# Patient Record
Sex: FEMALE | Race: WHITE | HISPANIC OR LATINO | Employment: OTHER | URBAN - METROPOLITAN AREA
[De-identification: names, ages, dates, MRNs, and addresses within clinical notes are randomized per-mention and may not be internally consistent; named-entity substitution may affect disease eponyms.]

---

## 2017-10-17 NOTE — PATIENT DISCUSSION
2 week PO: Patient is doing well post-operatively. The importance of post-op drop compliance was emphasized. Drop scheduled reviewed with patient. Patient to call if any visual changes or concerns.

## 2018-05-22 NOTE — PROCEDURE NOTE: SURGICAL
Prior to commencing surgery patient identification, surgical procedure, site, and side were confirmed by Dr. Ree Leung. Following topical proparacaine anesthesia, the patient was positioned at the YAG laser, a contact lens coupled to the cornea with methylcellulose and an axial posterior capsulotomy performed without complication using 3.3 Mj x 51. Excess methylcellulose was washed from the eye, one drop of Alphagan was instilled and the patient returned to the holding area having tolerated the procedure well and without complication. <br />[id number]

## 2018-05-29 NOTE — PROCEDURE NOTE: SURGICAL
<p>Prior to commencing surgery patient identification, surgical procedure, site, and side were confirmed by Dr. Ty Pelaez. Following topical proparacaine anesthesia, the patient was positioned at the YAG laser, a contact lens coupled to the cornea with methylcellulose and an axial posterior capsulotomy performed without complication using 3.4 Mj x 34. Excess methylcellulose was washed from the eye, one drop of Alphagan was instilled and the patient returned to the holding area having tolerated the procedure well and without complication. </p><p>MRN [id number]</p>

## 2018-07-24 NOTE — PATIENT DISCUSSION
Advised patient to try out a soft contact lense in the right eye for better distance vision. If patient likes the distance vision the contact lens provides we will go forward with Epi-LASIK OD.

## 2019-04-19 NOTE — PATIENT DISCUSSION
85% single surgery success rate. Approximately 15% of patients may need multiple surgeries (3, 4, 5, etc.) and with multiple surgeries 98% achieve reattachment. Even with reattachment there is a significant chance of mild to severe vision loss.

## 2019-04-19 NOTE — PATIENT DISCUSSION
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  Treatment options and risks/goals/benefits discussed.  Recommended vitrectomy, endolaser, fluid gas exchage to attempt to re-attach retina. . Discussed benefits, alternatives, and risks of surgery including (but not limited to) cataract (if patient has natural lens), glaucoma, infection, bleeding, re-detachment, optic neuropathy, loss of vision, blindness, and loss of eye. Patient understands more than one operation may be needed to repair retinal detachment.  Risk of PVR causing re-detachment discussed, if re-detachment develops then will require additional surgery.

## 2019-04-19 NOTE — PATIENT DISCUSSION
Post-op instructions given. Discussed drops, positioning, no strenuous activity and eye protection. Call immediately if eye pain or loss of vision. Stressed needs to be face down until Tuesday then recommend sleeping on Right side, NO FLAT ON BACK. Ok to go back to work after [employer] face down position and if feels well enough. But stressed NO HEAVY lifting or anything strenuous.

## 2019-04-19 NOTE — PATIENT DISCUSSION
Discussed need for post-operative positioning, face down for 5 days(pt given chair rental info).  Pt to be off work at least until after 5 day positioning period.

## 2019-04-20 NOTE — PATIENT DISCUSSION
Recommended waiting to drive again, due to limited depth perception in the immediate post-operative period.

## 2019-04-22 NOTE — PATIENT DISCUSSION
Final visual acuity after complete healing is hard to predict at this point. Can take 6m-1yr to reach full VA potential. [place] will never be what it was before the Mac Off RD.

## 2019-04-29 NOTE — PATIENT DISCUSSION
Vision will not improve until gas bubble diminishes in size. Still No heavy lifting(no more than 20 lbs) or real strenuous activity that jars head around.

## 2019-06-24 NOTE — PATIENT DISCUSSION
Final visual acuity after complete healing is hard to predict at this point. Can take 6m-1yr to reach full VA potential. [facility] will never be what it was before the Mac Off RD.

## 2019-06-24 NOTE — PATIENT DISCUSSION
any side okay for sleeping, resume normal activity but avoid chainsaws or rollercoater any activity that violently jars head.

## 2019-10-03 ENCOUNTER — NEW PATIENT COMPREHENSIVE (OUTPATIENT)
Dept: URBAN - METROPOLITAN AREA CLINIC 38 | Facility: CLINIC | Age: 44
End: 2019-10-03

## 2019-10-03 DIAGNOSIS — H52.203: ICD-10-CM

## 2019-10-03 DIAGNOSIS — H52.4: ICD-10-CM

## 2019-10-03 PROCEDURE — 92015 DETERMINE REFRACTIVE STATE: CPT

## 2019-10-03 PROCEDURE — 92310-2 LEVEL 2 CONTACT LENS MANAGEMENT

## 2019-10-03 PROCEDURE — 92004 COMPRE OPH EXAM NEW PT 1/>: CPT

## 2019-10-03 ASSESSMENT — VISUAL ACUITY
OD_SC: J8
OS_SC: J4
OS_SC: 20/50
OD_CC: 20/25+2
OS_CC: 20/20
OD_SC: 20/60

## 2019-10-03 ASSESSMENT — TONOMETRY
OD_IOP_MMHG: 18
OS_IOP_MMHG: 18

## 2020-01-09 ENCOUNTER — EST. PATIENT EMERGENCY (OUTPATIENT)
Dept: URBAN - METROPOLITAN AREA CLINIC 38 | Facility: CLINIC | Age: 45
End: 2020-01-09

## 2020-01-09 DIAGNOSIS — H00.014: ICD-10-CM

## 2020-01-09 PROCEDURE — 99212 OFFICE O/P EST SF 10 MIN: CPT

## 2020-01-09 RX ORDER — TOBRAMYCIN AND DEXAMETHASONE 1; 3 MG/ML; MG/ML: 1 SUSPENSION/ DROPS OPHTHALMIC

## 2020-01-09 RX ORDER — TOBRAMYCIN AND DEXAMETHASONE 3; 1 MG/G; MG/G: 1/2 OINTMENT OPHTHALMIC EVERY EVENING

## 2020-01-09 ASSESSMENT — VISUAL ACUITY
OD_SC: 20/40-2
OS_SC: 20/50

## 2020-01-09 ASSESSMENT — TONOMETRY: OS_IOP_MMHG: 17

## 2020-02-06 ENCOUNTER — FOLLOW UP (OUTPATIENT)
Dept: URBAN - METROPOLITAN AREA CLINIC 38 | Facility: CLINIC | Age: 45
End: 2020-02-06

## 2020-02-06 DIAGNOSIS — H00.014: ICD-10-CM

## 2020-02-06 PROCEDURE — 99212 OFFICE O/P EST SF 10 MIN: CPT

## 2020-02-06 ASSESSMENT — VISUAL ACUITY
OD_CC: 20/20-1
OU_CC: 20/20
OS_CC: 20/20-2

## 2020-02-06 ASSESSMENT — TONOMETRY: OS_IOP_MMHG: 17

## 2020-04-09 ENCOUNTER — FOLLOW UP (OUTPATIENT)
Dept: URBAN - METROPOLITAN AREA CLINIC 38 | Facility: CLINIC | Age: 45
End: 2020-04-09

## 2020-04-09 DIAGNOSIS — T15.11XA: ICD-10-CM

## 2020-04-09 PROCEDURE — 99212 OFFICE O/P EST SF 10 MIN: CPT

## 2020-11-16 ENCOUNTER — ESTABLISHED COMPREHENSIVE EXAM (OUTPATIENT)
Dept: URBAN - METROPOLITAN AREA CLINIC 38 | Facility: CLINIC | Age: 45
End: 2020-11-16

## 2020-11-16 DIAGNOSIS — H52.4: ICD-10-CM

## 2020-11-16 DIAGNOSIS — H52.203: ICD-10-CM

## 2020-11-16 DIAGNOSIS — Z46.0: ICD-10-CM

## 2020-11-16 PROCEDURE — 92015 DETERMINE REFRACTIVE STATE: CPT

## 2020-11-16 PROCEDURE — 92014 COMPRE OPH EXAM EST PT 1/>: CPT

## 2020-11-16 PROCEDURE — 92310-1 LEVEL 1 CONTACT LENS MANAGEMENT

## 2020-11-16 ASSESSMENT — TONOMETRY
OS_IOP_MMHG: 18
OD_IOP_MMHG: 18

## 2020-11-16 ASSESSMENT — VISUAL ACUITY
OD_SC: J2
OD_CC: 20/20-1
OS_SC: J2
OU_SC: J2
OS_CC: 20/20-1
OU_CC: 20/20

## 2021-01-13 ENCOUNTER — TECH ONLY (OUTPATIENT)
Dept: URBAN - METROPOLITAN AREA CLINIC 38 | Facility: CLINIC | Age: 46
End: 2021-01-13

## 2021-01-13 DIAGNOSIS — H40.013: ICD-10-CM

## 2021-01-13 PROCEDURE — 92083 EXTENDED VISUAL FIELD XM: CPT

## 2021-01-13 PROCEDURE — 99211T TECH SERVICE

## 2021-01-13 PROCEDURE — 92250 FUNDUS PHOTOGRAPHY W/I&R: CPT

## 2021-01-27 ENCOUNTER — EST. PATIENT EMERGENCY (OUTPATIENT)
Dept: URBAN - METROPOLITAN AREA CLINIC 38 | Facility: CLINIC | Age: 46
End: 2021-01-27

## 2021-01-27 DIAGNOSIS — H04.123: ICD-10-CM

## 2021-01-27 PROCEDURE — 92012 INTRM OPH EXAM EST PATIENT: CPT

## 2021-01-27 ASSESSMENT — VISUAL ACUITY
OD_SC: 20/40-2
OS_SC: 20/40-2

## 2021-01-27 ASSESSMENT — TONOMETRY
OD_IOP_MMHG: 17
OS_IOP_MMHG: 16

## 2021-03-15 NOTE — PATIENT DISCUSSION
Trial contact lens removed from the right eye. Was The Patient On Physician Recommended Anticoagulation Therapy?: Please Select the Appropriate Response

## 2021-04-26 ENCOUNTER — EST. PATIENT EMERGENCY (OUTPATIENT)
Dept: URBAN - METROPOLITAN AREA CLINIC 38 | Facility: CLINIC | Age: 46
End: 2021-04-26

## 2021-04-26 DIAGNOSIS — H00.021: ICD-10-CM

## 2021-04-26 PROCEDURE — 99212 OFFICE O/P EST SF 10 MIN: CPT

## 2021-04-26 RX ORDER — AZITHROMYCIN DIHYDRATE 250 MG/1
1 TABLET, FILM COATED ORAL ONCE A DAY
Start: 2021-04-26 | End: 2021-04-30

## 2021-04-26 ASSESSMENT — VISUAL ACUITY
OU_CC: 20/20 CLS
OS_CC: 20/25 CLS
OD_CC: 20/25 CLS

## 2021-04-26 ASSESSMENT — TONOMETRY
OS_IOP_MMHG: 17
OD_IOP_MMHG: 16

## 2021-07-23 NOTE — PATIENT DISCUSSION
Final visual acuity after complete healing is hard to predict at this point. Can take 6m-1yr to reach full VA potential. [place] will never be what it was before the Mac Off RD. Star Wedge Flap Text: The defect edges were debeveled with a #15 scalpel blade.  Given the location of the defect, shape of the defect and the proximity to free margins a star wedge flap was deemed most appropriate.  Using a sterile surgical marker, an appropriate rotation flap was drawn incorporating the defect and placing the expected incisions within the relaxed skin tension lines where possible. The area thus outlined was incised deep to adipose tissue with a #15 scalpel blade.  The skin margins were undermined to an appropriate distance in all directions utilizing iris scissors.

## 2021-08-17 NOTE — PATIENT DISCUSSION
Recommended artificial tears to use: 1 drop 4x a day in both eyes as needed. [No Acute Distress] : no acute distress [PERRL] : pupils equal round and reactive to light [Normal TMs] : both tympanic membranes were normal [Supple] : supple [Clear to Auscultation] : lungs were clear to auscultation bilaterally [Regular Rhythm] : with a regular rhythm [No Edema] : there was no peripheral edema [Normal Supraclavicular Nodes] : no supraclavicular lymphadenopathy [Normal Posterior Cervical Nodes] : no posterior cervical lymphadenopathy [Normal Anterior Cervical Nodes] : no anterior cervical lymphadenopathy [No CVA Tenderness] : no CVA  tenderness [No Joint Swelling] : no joint swelling [No Rash] : no rash [Normal Affect] : the affect was normal [de-identified] : systolic murmur  [de-identified] : using  walker

## 2021-08-30 ENCOUNTER — ESTABLISHED COMPREHENSIVE EXAM (OUTPATIENT)
Dept: URBAN - METROPOLITAN AREA CLINIC 38 | Facility: CLINIC | Age: 46
End: 2021-08-30

## 2021-08-30 DIAGNOSIS — H52.203: ICD-10-CM

## 2021-08-30 DIAGNOSIS — H52.4: ICD-10-CM

## 2021-08-30 DIAGNOSIS — H04.123: ICD-10-CM

## 2021-08-30 DIAGNOSIS — H02.886: ICD-10-CM

## 2021-08-30 DIAGNOSIS — H02.883: ICD-10-CM

## 2021-08-30 PROCEDURE — 92310-1 LEVEL 1 CONTACT LENS MANAGEMENT

## 2021-08-30 PROCEDURE — 92015 DETERMINE REFRACTIVE STATE: CPT

## 2021-08-30 PROCEDURE — 92014 COMPRE OPH EXAM EST PT 1/>: CPT

## 2021-08-30 ASSESSMENT — VISUAL ACUITY
OD_CC: 20/20-1
OS_CC: 20/20
OD_SC: J3-
OU_SC: J2

## 2021-08-30 ASSESSMENT — TONOMETRY
OD_IOP_MMHG: 12
OS_IOP_MMHG: 12

## 2022-03-02 ENCOUNTER — TECH ONLY (OUTPATIENT)
Dept: URBAN - METROPOLITAN AREA CLINIC 38 | Facility: CLINIC | Age: 47
End: 2022-03-02

## 2022-03-02 DIAGNOSIS — H40.012: ICD-10-CM

## 2022-03-02 DIAGNOSIS — H40.021: ICD-10-CM

## 2022-03-02 PROCEDURE — 92083 EXTENDED VISUAL FIELD XM: CPT

## 2022-03-02 PROCEDURE — 99211T TECH SERVICE

## 2022-07-30 ENCOUNTER — TELEPHONE ENCOUNTER (OUTPATIENT)
Age: 47
End: 2022-07-30

## 2022-07-30 RX ORDER — DOCUSATE SODIUM 100 MG/1
CAPSULE ORAL
Qty: 0 | Refills: 2 | OUTPATIENT
Start: 2013-05-07 | End: 2013-06-06

## 2022-07-31 ENCOUNTER — TELEPHONE ENCOUNTER (OUTPATIENT)
Age: 47
End: 2022-07-31

## 2022-07-31 RX ORDER — DOCUSATE SODIUM 100 MG/1
CAPSULE ORAL
Qty: 0 | Refills: 2 | Status: ACTIVE | COMMUNITY
Start: 2013-05-07

## 2022-07-31 RX ORDER — HYOSCYAMINE SULFATE 0.12 MG/1
1-2 TABLET, CHEWABLE ORAL
Qty: 0 | Refills: 16 | Status: ACTIVE | COMMUNITY
Start: 2013-05-07

## 2022-09-09 ENCOUNTER — COMPREHENSIVE EXAM (OUTPATIENT)
Dept: URBAN - METROPOLITAN AREA CLINIC 38 | Facility: CLINIC | Age: 47
End: 2022-09-09

## 2022-09-09 DIAGNOSIS — H52.7: ICD-10-CM

## 2022-09-09 DIAGNOSIS — H52.203: ICD-10-CM

## 2022-09-09 DIAGNOSIS — H40.021: ICD-10-CM

## 2022-09-09 DIAGNOSIS — H40.012: ICD-10-CM

## 2022-09-09 DIAGNOSIS — H02.883: ICD-10-CM

## 2022-09-09 DIAGNOSIS — H04.123: ICD-10-CM

## 2022-09-09 DIAGNOSIS — H02.886: ICD-10-CM

## 2022-09-09 PROCEDURE — 92015 DETERMINE REFRACTIVE STATE: CPT

## 2022-09-09 PROCEDURE — 92310-1 LEVEL 1 CONTACT LENS MANAGEMENT

## 2022-09-09 PROCEDURE — 92014 COMPRE OPH EXAM EST PT 1/>: CPT

## 2022-09-09 ASSESSMENT — VISUAL ACUITY
OD_CC: 20/20-1
OS_CC: J4
OS_CC: 20/20-1
OD_CC: J4
OU_CC: 20/20
OU_CC: J3

## 2022-09-09 ASSESSMENT — TONOMETRY
OS_IOP_MMHG: 15
OD_IOP_MMHG: 16

## 2023-01-12 ENCOUNTER — EMERGENCY VISIT (OUTPATIENT)
Dept: URBAN - METROPOLITAN AREA CLINIC 38 | Facility: CLINIC | Age: 48
End: 2023-01-12

## 2023-01-12 DIAGNOSIS — H00.023: ICD-10-CM

## 2023-01-12 PROCEDURE — 99212 OFFICE O/P EST SF 10 MIN: CPT

## 2023-01-12 RX ORDER — DOXYCYCLINE 100 MG/1
1 CAPSULE ORAL TWICE A DAY
Start: 2023-01-12

## 2023-01-12 ASSESSMENT — VISUAL ACUITY
OS_CC: 20/20
OU_CC: 20/20 W/ GLASSES
OD_CC: 20/20-1

## 2023-01-12 ASSESSMENT — TONOMETRY: OD_IOP_MMHG: 12

## 2023-12-18 ENCOUNTER — COMPREHENSIVE EXAM (OUTPATIENT)
Dept: URBAN - METROPOLITAN AREA CLINIC 38 | Facility: CLINIC | Age: 48
End: 2023-12-18

## 2023-12-18 DIAGNOSIS — H52.4: ICD-10-CM

## 2023-12-18 DIAGNOSIS — Z46.0: ICD-10-CM

## 2023-12-18 DIAGNOSIS — H52.7: ICD-10-CM

## 2023-12-18 DIAGNOSIS — H52.203: ICD-10-CM

## 2023-12-18 DIAGNOSIS — H40.021: ICD-10-CM

## 2023-12-18 DIAGNOSIS — H40.012: ICD-10-CM

## 2023-12-18 DIAGNOSIS — H25.813: ICD-10-CM

## 2023-12-18 DIAGNOSIS — Z83.511: ICD-10-CM

## 2023-12-18 PROCEDURE — 92014 COMPRE OPH EXAM EST PT 1/>: CPT

## 2023-12-18 PROCEDURE — 92310-1 LEVEL 1 CONTACT LENS MANAGEMENT

## 2023-12-18 PROCEDURE — 92015 DETERMINE REFRACTIVE STATE: CPT

## 2023-12-18 RX ORDER — OLOPATADINE HYDROCHLORIDE 2 MG/ML: 1 SOLUTION OPHTHALMIC ONCE A DAY

## 2023-12-18 ASSESSMENT — VISUAL ACUITY
OD_CC: 20/25-2
OS_CC: 20/20
OU_CC: J1
OD_CC: J1
OS_CC: J2

## 2023-12-18 ASSESSMENT — TONOMETRY
OD_IOP_MMHG: 15
OS_IOP_MMHG: 15

## 2024-03-12 NOTE — PATIENT DISCUSSION
Advised against air travel while intraocular gas bubble is present.
Areas of weakness and Tear/RD-See #1, will re-enforce with laser during surgical repair of RD.
Discussed need for post-operative positioning and discussed, either side okay but no flat on back.  Still no real strenuous activity or heavy lifting.
Doing well, retina attached, good IOP with no signs of endophthalmitis.
Final visual acuity after complete healing is hard to predict at this point. Can take 6m-1yr to reach full VA potential. [place] will never be what it was before the Mac Off RD.
Membrane is not visually significant.  Rec observation.
Monitor.
Nevus looks stable, no orange pigment.
No retinal detachment or retinal tear noted OS +RD OD(see #1).
Not assessed today.
OTC Reading glasses recommended.
Patient appreciated improvement in her distance vision with the trial contact lens.
Patient education on limited BCVA.
Patient understands condition, prognosis and need for follow up care.
Recommended artificial tears to use: 1 drop 4x a day in both eyes as needed, Refresh Optive recommended brand. Advised to take Omega 3 fatty acids, Flaxseed Oil, in supplements. 2-4 grams a day. Recommended warm compresses 10 minutes once a day.
Retinal tear and detachment warning symptoms reviewed and patient instructed to call immediately if increasing floaters, flashes, or decreasing peripheral vision.
See #1.
Stable, monitor.
Stable.
Surgery was anatomically successful. Advised to call with any vision changes, 24/7 ER reviewed.
Trial contact lens removed from the right eye.
Vision will not improve until gas bubble diminishes in size. Still No heavy lifting(no more than 20 lbs) or real strenuous activity that jars head around.
epiLASEK OD with a goal of mary.
Rehabilitation services

## 2025-05-21 ENCOUNTER — COMPREHENSIVE EXAM (OUTPATIENT)
Age: 50
End: 2025-05-21

## 2025-05-21 DIAGNOSIS — H52.4: ICD-10-CM

## 2025-05-21 DIAGNOSIS — H40.013: ICD-10-CM

## 2025-05-21 DIAGNOSIS — H04.123: ICD-10-CM

## 2025-05-21 DIAGNOSIS — H00.023: ICD-10-CM

## 2025-05-21 DIAGNOSIS — H52.203: ICD-10-CM

## 2025-05-21 DIAGNOSIS — H02.883: ICD-10-CM

## 2025-05-21 DIAGNOSIS — Z46.0: ICD-10-CM

## 2025-05-21 DIAGNOSIS — H02.886: ICD-10-CM

## 2025-05-21 DIAGNOSIS — H25.813: ICD-10-CM

## 2025-05-21 DIAGNOSIS — H00.14: ICD-10-CM

## 2025-05-21 DIAGNOSIS — Z83.511: ICD-10-CM

## 2025-05-21 DIAGNOSIS — H52.7: ICD-10-CM

## 2025-05-21 PROCEDURE — 92015 DETERMINE REFRACTIVE STATE: CPT

## 2025-05-21 PROCEDURE — 92014 COMPRE OPH EXAM EST PT 1/>: CPT

## 2025-05-21 PROCEDURE — 92310-1 LEVEL 1 SOFT LENS UPDATE

## 2025-05-21 RX ORDER — DOXYCYCLINE 100 MG/1: 1 CAPSULE ORAL TWICE A DAY

## 2025-05-21 RX ORDER — NEOMYCIN SULFATE, POLYMYXIN B SULFATE AND DEXAMETHASONE 3.5; 10000; 1 MG/G; [USP'U]/G; MG/G: OINTMENT OPHTHALMIC TWICE A DAY
